# Patient Record
Sex: MALE | Race: OTHER | HISPANIC OR LATINO
[De-identification: names, ages, dates, MRNs, and addresses within clinical notes are randomized per-mention and may not be internally consistent; named-entity substitution may affect disease eponyms.]

---

## 2020-06-04 PROBLEM — Z00.00 ENCOUNTER FOR PREVENTIVE HEALTH EXAMINATION: Status: ACTIVE | Noted: 2020-06-04

## 2020-06-09 ENCOUNTER — APPOINTMENT (OUTPATIENT)
Dept: ORTHOPEDIC SURGERY | Facility: CLINIC | Age: 35
End: 2020-06-09
Payer: MEDICAID

## 2020-06-12 ENCOUNTER — APPOINTMENT (OUTPATIENT)
Dept: ORTHOPEDIC SURGERY | Facility: CLINIC | Age: 35
End: 2020-06-12
Payer: MEDICAID

## 2020-06-12 VITALS
BODY MASS INDEX: 28 KG/M2 | SYSTOLIC BLOOD PRESSURE: 138 MMHG | WEIGHT: 200 LBS | DIASTOLIC BLOOD PRESSURE: 87 MMHG | HEIGHT: 71 IN

## 2020-06-12 VITALS — TEMPERATURE: 97.7 F

## 2020-06-12 DIAGNOSIS — Z78.9 OTHER SPECIFIED HEALTH STATUS: ICD-10-CM

## 2020-06-12 PROCEDURE — 99203 OFFICE O/P NEW LOW 30 MIN: CPT

## 2020-06-12 PROCEDURE — 73080 X-RAY EXAM OF ELBOW: CPT | Mod: RT
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
## 2020-06-12 NOTE — PHYSICAL EXAM
[de-identified] : General Exam\par \par Well developed, well nourished\par No apparent distress\par Oriented to person, place, and time\par Mood: Normal\par Affect: Normal\par Balance and coordination: Normal\par Gait: Normal\par \par right elbow exam:\par \par Skin: Clean, dry, intact. No ecchymosis. No swelling. No palpable joint effusion. + rev ronny\par Tenderness: no tenderness to palpation lateral epicondyle, No medial epicondyle, olecranon, radial head. \par ROM:0-140° full pronation full supination pos hook test\par Stability: Stable to vaus/valgus stress\par Neuro: Negative tinels at ulnar canal, AIN/PIN/Ulnar nerve in tact to motor/sensation.\par Strength: 5/5 elbow flexion, 5/5 elbow extension, 5/5 supination, 5/5 pronation\par Sensation: In tact to light touch throughout\par Vasc: 2+ radial pulse, <2s cap refill\par  [de-identified] : \par The following radiographs were ordered and read by me during this patients visit. I reviewed each radiograph in detail with the patient and discussed the findings as highlighted below. \par \par 3 views right elbow were obtained today that show no fracture, dislocation. There is no degenerative change seen. There is no malalignment. No obvious osseous abnormality. Otherwise unremarkable.\par \par MRI report reviewed distal biceps rupture with retraction.\par

## 2020-06-12 NOTE — DISCUSSION/SUMMARY
[de-identified] : \par 34-year-old right-hand dominant male with right distal biceps rupture with significant retraction. We discussed treatment options at length. We discussed nonsurgical management of permanent deformity and weakness with flexion and supination. We discussed surgical treatment right elbow distal biceps tendon repair. We discussed the surgery postoperative protocol her stretches at length. Risks benefits alternatives of surgery discussed included but not limited to risk such as bleeding infection nerve and vessel injury continued pain stiffness heterotopic ossification and need for future surgery medical complications such as DVT or PE anesthesia complications all questions were answered. He will schedule at his convenience.

## 2020-06-17 ENCOUNTER — OUTPATIENT (OUTPATIENT)
Dept: OUTPATIENT SERVICES | Facility: HOSPITAL | Age: 35
LOS: 1 days | Discharge: HOME | End: 2020-06-17

## 2020-06-17 DIAGNOSIS — S46.211A STRAIN OF MUSCLE, FASCIA AND TENDON OF OTHER PARTS OF BICEPS, RIGHT ARM, INITIAL ENCOUNTER: ICD-10-CM

## 2020-06-17 DIAGNOSIS — Z01.818 ENCOUNTER FOR OTHER PREPROCEDURAL EXAMINATION: ICD-10-CM

## 2020-06-17 LAB
ANION GAP SERPL CALC-SCNC: 6 MMOL/L — SIGNIFICANT CHANGE UP (ref 5–17)
APTT BLD: 32.5 SEC — SIGNIFICANT CHANGE UP (ref 27.5–36.3)
BASOPHILS # BLD AUTO: 0.02 K/UL — SIGNIFICANT CHANGE UP (ref 0–0.2)
BASOPHILS NFR BLD AUTO: 0.3 % — SIGNIFICANT CHANGE UP (ref 0–2)
BUN SERPL-MCNC: 10 MG/DL — SIGNIFICANT CHANGE UP (ref 7–23)
CALCIUM SERPL-MCNC: 8.9 MG/DL — SIGNIFICANT CHANGE UP (ref 8.5–10.1)
CHLORIDE SERPL-SCNC: 106 MMOL/L — SIGNIFICANT CHANGE UP (ref 96–108)
CO2 SERPL-SCNC: 26 MMOL/L — SIGNIFICANT CHANGE UP (ref 22–31)
CREAT SERPL-MCNC: 0.98 MG/DL — SIGNIFICANT CHANGE UP (ref 0.5–1.3)
EOSINOPHIL # BLD AUTO: 0.08 K/UL — SIGNIFICANT CHANGE UP (ref 0–0.5)
EOSINOPHIL NFR BLD AUTO: 1.3 % — SIGNIFICANT CHANGE UP (ref 0–6)
GLUCOSE SERPL-MCNC: 95 MG/DL — SIGNIFICANT CHANGE UP (ref 70–99)
HCT VFR BLD CALC: 41.2 % — SIGNIFICANT CHANGE UP (ref 39–50)
HGB BLD-MCNC: 14.4 G/DL — SIGNIFICANT CHANGE UP (ref 13–17)
IMM GRANULOCYTES NFR BLD AUTO: 0 % — SIGNIFICANT CHANGE UP (ref 0–1.5)
INR BLD: 1.11 RATIO — SIGNIFICANT CHANGE UP (ref 0.88–1.16)
LYMPHOCYTES # BLD AUTO: 2.07 K/UL — SIGNIFICANT CHANGE UP (ref 1–3.3)
LYMPHOCYTES # BLD AUTO: 33.5 % — SIGNIFICANT CHANGE UP (ref 13–44)
MCHC RBC-ENTMCNC: 32.5 PG — SIGNIFICANT CHANGE UP (ref 27–34)
MCHC RBC-ENTMCNC: 35 GM/DL — SIGNIFICANT CHANGE UP (ref 32–36)
MCV RBC AUTO: 93 FL — SIGNIFICANT CHANGE UP (ref 80–100)
MONOCYTES # BLD AUTO: 0.41 K/UL — SIGNIFICANT CHANGE UP (ref 0–0.9)
MONOCYTES NFR BLD AUTO: 6.6 % — SIGNIFICANT CHANGE UP (ref 2–14)
NEUTROPHILS # BLD AUTO: 3.59 K/UL — SIGNIFICANT CHANGE UP (ref 1.8–7.4)
NEUTROPHILS NFR BLD AUTO: 58.3 % — SIGNIFICANT CHANGE UP (ref 43–77)
NRBC # BLD: 0 /100 WBCS — SIGNIFICANT CHANGE UP (ref 0–0)
PLATELET # BLD AUTO: 299 K/UL — SIGNIFICANT CHANGE UP (ref 150–400)
POTASSIUM SERPL-MCNC: 4 MMOL/L — SIGNIFICANT CHANGE UP (ref 3.5–5.3)
POTASSIUM SERPL-SCNC: 4 MMOL/L — SIGNIFICANT CHANGE UP (ref 3.5–5.3)
PROTHROM AB SERPL-ACNC: 12.5 SEC — SIGNIFICANT CHANGE UP (ref 10–12.9)
RBC # BLD: 4.43 M/UL — SIGNIFICANT CHANGE UP (ref 4.2–5.8)
RBC # FLD: 12.2 % — SIGNIFICANT CHANGE UP (ref 10.3–14.5)
SODIUM SERPL-SCNC: 138 MMOL/L — SIGNIFICANT CHANGE UP (ref 135–145)
WBC # BLD: 6.17 K/UL — SIGNIFICANT CHANGE UP (ref 3.8–10.5)
WBC # FLD AUTO: 6.17 K/UL — SIGNIFICANT CHANGE UP (ref 3.8–10.5)
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
## 2020-06-17 NOTE — H&P PST ADULT - NSICDXPROBLEM_GEN_ALL_CORE_FT
PROBLEM DIAGNOSES  Problem: Strain of muscle, fascia and tendon of other parts of biceps, right arm, initial encounter  Assessment and Plan: Right elbow distal biceps repari  Pre-op instructions given by RN, patient verbalized understanding  Chlorhexidine wash instructions given   Patient to return on 6- for COVID testing
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
## 2020-06-17 NOTE — H&P PST ADULT - NSANTHOSAYNRD_GEN_A_CORE
No. SHALOM screening performed.  STOP BANG Legend: 0-2 = LOW Risk; 3-4 = INTERMEDIATE Risk; 5-8 = HIGH Risk

## 2020-06-20 LAB — SARS-COV-2 RNA SPEC QL NAA+PROBE: SIGNIFICANT CHANGE UP

## 2020-06-21 ENCOUNTER — TRANSCRIPTION ENCOUNTER (OUTPATIENT)
Age: 35
End: 2020-06-21

## 2020-06-22 ENCOUNTER — OUTPATIENT (OUTPATIENT)
Dept: OUTPATIENT SERVICES | Facility: HOSPITAL | Age: 35
LOS: 1 days | Discharge: HOME | End: 2020-06-22
Payer: MEDICAID

## 2020-06-22 ENCOUNTER — APPOINTMENT (OUTPATIENT)
Dept: ORTHOPEDIC SURGERY | Facility: HOSPITAL | Age: 35
End: 2020-06-22

## 2020-06-22 VITALS
TEMPERATURE: 98 F | OXYGEN SATURATION: 98 % | HEIGHT: 71 IN | RESPIRATION RATE: 16 BRPM | SYSTOLIC BLOOD PRESSURE: 120 MMHG | WEIGHT: 207.9 LBS | HEART RATE: 60 BPM | DIASTOLIC BLOOD PRESSURE: 80 MMHG

## 2020-06-22 VITALS
RESPIRATION RATE: 19 BRPM | HEART RATE: 61 BPM | SYSTOLIC BLOOD PRESSURE: 143 MMHG | OXYGEN SATURATION: 100 % | DIASTOLIC BLOOD PRESSURE: 88 MMHG | TEMPERATURE: 98 F
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
## 2020-06-22 PROCEDURE — 24342 REPAIR OF RUPTURED TENDON: CPT | Mod: RT

## 2020-06-22 RX ORDER — OXYCODONE HYDROCHLORIDE 5 MG/1
1 TABLET ORAL
Qty: 30 | Refills: 0
Start: 2020-06-22

## 2020-06-22 RX ORDER — FENTANYL CITRATE 50 UG/ML
25 INJECTION INTRAVENOUS
Refills: 0 | Status: DISCONTINUED | OUTPATIENT
Start: 2020-06-22 | End: 2020-06-22

## 2020-06-22 RX ORDER — ACETAMINOPHEN 500 MG
1000 TABLET ORAL ONCE
Refills: 0 | Status: DISCONTINUED | OUTPATIENT
Start: 2020-06-22 | End: 2020-06-22

## 2020-06-22 RX ORDER — FENTANYL CITRATE 50 UG/ML
50 INJECTION INTRAVENOUS
Refills: 0 | Status: DISCONTINUED | OUTPATIENT
Start: 2020-06-22 | End: 2020-06-22

## 2020-06-22 RX ORDER — DOCUSATE SODIUM 100 MG
1 CAPSULE ORAL
Qty: 30 | Refills: 0
Start: 2020-06-22 | End: 2020-07-11

## 2020-06-22 RX ORDER — INDOMETHACIN 50 MG
1 CAPSULE ORAL
Qty: 42 | Refills: 0
Start: 2020-06-22 | End: 2020-07-05

## 2020-06-22 RX ORDER — OXYCODONE HYDROCHLORIDE 5 MG/1
1 TABLET ORAL
Qty: 40 | Refills: 0
Start: 2020-06-22

## 2020-06-22 RX ORDER — ONDANSETRON 8 MG/1
1 TABLET, FILM COATED ORAL
Qty: 10 | Refills: 0
Start: 2020-06-22

## 2020-06-22 RX ORDER — SODIUM CHLORIDE 9 MG/ML
1000 INJECTION, SOLUTION INTRAVENOUS
Refills: 0 | Status: DISCONTINUED | OUTPATIENT
Start: 2020-06-22 | End: 2020-06-22

## 2020-06-22 RX ADMIN — SODIUM CHLORIDE 75 MILLILITER(S): 9 INJECTION, SOLUTION INTRAVENOUS at 16:43

## 2020-06-22 NOTE — ASU DISCHARGE PLAN (ADULT/PEDIATRIC) - CALL YOUR DOCTOR IF YOU HAVE ANY OF THE FOLLOWING:
Wound/Surgical Site with redness, or foul smelling discharge or pus/Bleeding that does not stop/Numbness, tingling, color or temperature change to extremity/Pain not relieved by Medications

## 2020-06-22 NOTE — ASU DISCHARGE PLAN (ADULT/PEDIATRIC) - ASU DC SPECIAL INSTRUCTIONSFT
Non weight bearing right upper extremity  Pain control with tylenol/NSAIDs, oxycodone for severe pain  Encourage ambulation  Encourage motion of fingers/hand  Follow up with Dr. Archer in 2 weeks, call office for appointment  Ice elbow as needed  Keep dressing clean/dry/intact Non weight bearing right upper extremity in splint/sling  Pain control with tylenol/NSAIDs, oxycodone for severe pain  Encourage ambulation  Encourage motion of fingers/hand  Follow up with Dr. Archer in 2 weeks, call office for appointment  Take indomethacin 25mg three times a day for 2 weeks  Ice elbow as needed  Keep dressing/splint clean/dry/intact

## 2020-06-22 NOTE — ASU DISCHARGE PLAN (ADULT/PEDIATRIC) - CARE PROVIDER_API CALL
Lew Archer  ORTHOPAEDIC SURGERY  [address] [facility] [address]  [place] [address]  Phone: [phone]  Fax: [phone]  Follow Up Time:

## 2020-06-23 ENCOUNTER — RESULT REVIEW (OUTPATIENT)
Age: 35
End: 2020-06-23

## 2020-06-26 DIAGNOSIS — S46.211A STRAIN OF MUSCLE, FASCIA AND TENDON OF OTHER PARTS OF BICEPS, RIGHT ARM, INITIAL ENCOUNTER: ICD-10-CM

## 2020-07-08 ENCOUNTER — APPOINTMENT (OUTPATIENT)
Dept: ORTHOPEDIC SURGERY | Facility: CLINIC | Age: 35
End: 2020-07-08
Payer: MEDICAID

## 2020-07-08 PROCEDURE — 99024 POSTOP FOLLOW-UP VISIT: CPT

## 2020-07-08 NOTE — HISTORY OF PRESENT ILLNESS
[de-identified] : R elbow [de-identified] : 35yo M s/p Right elbow distal biceps tendon repair, 6/22/20. Doing well.  pain controlled.  in splint [de-identified] : right elbow exam.\par inc healed well. no erythema\par no pain gentle passive rom\par able to flex/ext all fingers\par silt r/u/m/ax\par bcr [de-identified] : 33yo M s/p Right elbow distal biceps tendon repair, 6/22/20. [de-identified] : We reviewed postoperative protocol and restrictions. Placed in hinged elbow brace 45-90° and her start physical therapy. He will followup in 4-6 weeks. All questions answered

## 2020-07-13 PROBLEM — Z78.9 OTHER SPECIFIED HEALTH STATUS: Chronic | Status: ACTIVE | Noted: 2020-06-17

## 2020-08-19 ENCOUNTER — APPOINTMENT (OUTPATIENT)
Dept: ORTHOPEDIC SURGERY | Facility: CLINIC | Age: 35
End: 2020-08-19
Payer: MEDICAID

## 2020-08-19 PROCEDURE — 99024 POSTOP FOLLOW-UP VISIT: CPT

## 2020-08-19 NOTE — HISTORY OF PRESENT ILLNESS
[de-identified] : LARS farris [de-identified] : 35yo M s/p Right elbow distal biceps tendon repair, 6/22/20. He is doing well at this time. He reports no pain. He is using his brace. He did present to 30 minutes late for his appointment today. denies n/t [de-identified] : right elbow exam.\par inc healed well. no erythema\par no pain gentle passive rom\par 0-140\par able to flex/ext all fingers\par silt r/u/m/ax\par bcr. \par  [de-identified] : We reviewed postoperative protocol instructions. Elbow brace unlocked encouraged full note motion. Continue therapy light resistance early strengthening followup 6 weeks [de-identified] : 33yo M s/p Right elbow distal biceps tendon repair, 6/22/20.

## 2020-09-30 ENCOUNTER — APPOINTMENT (OUTPATIENT)
Dept: ORTHOPEDIC SURGERY | Facility: CLINIC | Age: 35
End: 2020-09-30
Payer: MEDICAID

## 2020-09-30 PROCEDURE — 99213 OFFICE O/P EST LOW 20 MIN: CPT

## 2020-09-30 NOTE — HISTORY OF PRESENT ILLNESS
[de-identified] : LARS farris [de-identified] : 33yo M s/p Right elbow distal biceps tendon repair, 6/22/20. He has returned to the gym. e is doing PT. denies n/t [de-identified] : right elbow exam.\par inc healed well. no erythema\par no pain gentle passive rom\par +ecchymosis in antecubital fossa\par able to flex/ext all fingers\par silt r/u/m/ax\par bcr. \par  [de-identified] : 35yo M s/p Right elbow distal biceps tendon repair, 6/22/20. [de-identified] : We reviewed postoperative protocol infections. We again discussed return to sport and gym slow gradual progression likely continue therapy followup 3 months

## 2020-11-03 ENCOUNTER — APPOINTMENT (OUTPATIENT)
Dept: INTERNAL MEDICINE | Facility: CLINIC | Age: 35
End: 2020-11-03

## 2020-11-03 VITALS
WEIGHT: 302 LBS | SYSTOLIC BLOOD PRESSURE: 132 MMHG | BODY MASS INDEX: 42.12 KG/M2 | RESPIRATION RATE: 14 BRPM | DIASTOLIC BLOOD PRESSURE: 78 MMHG | HEART RATE: 58 BPM | OXYGEN SATURATION: 97 % | TEMPERATURE: 97.1 F

## 2020-11-11 ENCOUNTER — APPOINTMENT (OUTPATIENT)
Dept: ORTHOPEDIC SURGERY | Facility: CLINIC | Age: 35
End: 2020-11-11

## 2020-11-11 DIAGNOSIS — S46.211A STRAIN OF MUSCLE, FASCIA AND TENDON OF OTHER PARTS OF BICEPS, RIGHT ARM, INITIAL ENCOUNTER: ICD-10-CM

## 2020-12-23 ENCOUNTER — APPOINTMENT (OUTPATIENT)
Dept: ORTHOPEDIC SURGERY | Facility: CLINIC | Age: 35
End: 2020-12-23